# Patient Record
Sex: FEMALE | Race: WHITE | NOT HISPANIC OR LATINO | Employment: OTHER | URBAN - METROPOLITAN AREA
[De-identification: names, ages, dates, MRNs, and addresses within clinical notes are randomized per-mention and may not be internally consistent; named-entity substitution may affect disease eponyms.]

---

## 2023-11-17 DIAGNOSIS — I10 HYPERTENSION, UNSPECIFIED TYPE: ICD-10-CM

## 2023-11-17 RX ORDER — TRIAMTERENE/HYDROCHLOROTHIAZID 37.5-25 MG
1 TABLET ORAL DAILY
COMMUNITY
Start: 2023-11-10

## 2023-11-17 RX ORDER — LOSARTAN POTASSIUM 50 MG/1
50 TABLET ORAL DAILY
COMMUNITY
Start: 2022-01-12 | End: 2023-11-17 | Stop reason: SDUPTHER

## 2023-11-17 RX ORDER — AMLODIPINE BESYLATE 5 MG/1
5 TABLET ORAL DAILY
COMMUNITY
Start: 2023-11-08

## 2023-11-17 RX ORDER — LOSARTAN POTASSIUM 50 MG/1
50 TABLET ORAL DAILY
Qty: 90 TABLET | Refills: 0 | Status: SHIPPED | OUTPATIENT
Start: 2023-11-17 | End: 2024-02-15

## 2023-11-17 NOTE — TELEPHONE ENCOUNTER
Pt called to let Dr Ramicone know that she went to [facility] ER for high BP. Her losartan was dc'd and amlodipine 5mg daily was started. Pt is still taking triam-hydrochlorothiazide 25/37.5 once daily.     Reviewed with Dr Ramicone, he would like her to restart losartan but at lower dose than previous- 50mg once daily.     Called pt and notified, losartan order pended to Dr Ramicone.

## 2023-12-05 DIAGNOSIS — I50.9 CONGESTIVE HEART FAILURE, UNSPECIFIED HF CHRONICITY, UNSPECIFIED HEART FAILURE TYPE (MULTI): ICD-10-CM

## 2024-01-11 ENCOUNTER — HOSPITAL ENCOUNTER (OUTPATIENT)
Dept: CARDIOLOGY | Facility: CLINIC | Age: 79
Discharge: HOME | End: 2024-01-11
Payer: COMMERCIAL

## 2024-01-11 DIAGNOSIS — I50.9 CONGESTIVE HEART FAILURE, UNSPECIFIED HF CHRONICITY, UNSPECIFIED HEART FAILURE TYPE (MULTI): ICD-10-CM

## 2024-01-11 PROCEDURE — 93294 REM INTERROG EVL PM/LDLS PM: CPT | Performed by: INTERNAL MEDICINE

## 2024-01-11 PROCEDURE — 93296 REM INTERROG EVL PM/IDS: CPT

## 2024-02-02 ENCOUNTER — APPOINTMENT (OUTPATIENT)
Dept: RADIOLOGY | Facility: HOSPITAL | Age: 79
End: 2024-02-02
Payer: COMMERCIAL

## 2024-04-29 ENCOUNTER — HOSPITAL ENCOUNTER (OUTPATIENT)
Dept: CARDIOLOGY | Facility: CLINIC | Age: 79
Discharge: HOME | End: 2024-04-29
Payer: COMMERCIAL

## 2024-04-29 DIAGNOSIS — I49.5 SICK SINUS SYNDROME (MULTI): ICD-10-CM

## 2024-04-29 DIAGNOSIS — Z95.0 PRESENCE OF CARDIAC PACEMAKER: ICD-10-CM

## 2024-04-29 PROCEDURE — 93294 REM INTERROG EVL PM/LDLS PM: CPT | Performed by: INTERNAL MEDICINE

## 2024-04-29 PROCEDURE — 93296 REM INTERROG EVL PM/IDS: CPT

## 2024-06-03 ENCOUNTER — HOSPITAL ENCOUNTER (OUTPATIENT)
Dept: CARDIOLOGY | Facility: CLINIC | Age: 79
Discharge: HOME | End: 2024-06-03
Payer: COMMERCIAL

## 2024-06-03 DIAGNOSIS — Z95.0 PRESENCE OF CARDIAC PACEMAKER: ICD-10-CM

## 2024-06-03 DIAGNOSIS — I49.5 SICK SINUS SYNDROME (MULTI): ICD-10-CM

## 2024-06-20 ENCOUNTER — HOSPITAL ENCOUNTER (OUTPATIENT)
Dept: CARDIOLOGY | Facility: CLINIC | Age: 79
Discharge: HOME | End: 2024-06-20
Payer: COMMERCIAL

## 2024-06-20 DIAGNOSIS — I49.5 SICK SINUS SYNDROME (MULTI): ICD-10-CM

## 2024-06-20 DIAGNOSIS — Z95.0 PRESENCE OF CARDIAC PACEMAKER: ICD-10-CM

## 2024-07-11 ENCOUNTER — HOSPITAL ENCOUNTER (OUTPATIENT)
Dept: CARDIOLOGY | Facility: CLINIC | Age: 79
Discharge: HOME | End: 2024-07-11
Payer: COMMERCIAL

## 2024-07-11 DIAGNOSIS — I49.5 SINOATRIAL NODE DYSFUNCTION (MULTI): ICD-10-CM

## 2024-07-25 ENCOUNTER — HOSPITAL ENCOUNTER (OUTPATIENT)
Dept: CARDIOLOGY | Facility: CLINIC | Age: 79
Discharge: HOME | End: 2024-07-25
Payer: COMMERCIAL

## 2024-07-25 DIAGNOSIS — Z95.0 PRESENCE OF CARDIAC PACEMAKER: ICD-10-CM

## 2024-07-25 DIAGNOSIS — I49.5 SICK SINUS SYNDROME (MULTI): ICD-10-CM

## 2024-08-21 ENCOUNTER — HOSPITAL ENCOUNTER (OUTPATIENT)
Dept: CARDIOLOGY | Facility: CLINIC | Age: 79
Discharge: HOME | End: 2024-08-21
Payer: COMMERCIAL

## 2024-08-21 DIAGNOSIS — Z95.0 PRESENCE OF CARDIAC PACEMAKER: ICD-10-CM

## 2024-08-21 DIAGNOSIS — I49.5 SICK SINUS SYNDROME (MULTI): ICD-10-CM

## 2024-08-21 PROCEDURE — 93296 REM INTERROG EVL PM/IDS: CPT

## 2024-08-28 ENCOUNTER — HOSPITAL ENCOUNTER (OUTPATIENT)
Dept: CARDIOLOGY | Facility: CLINIC | Age: 79
Discharge: HOME | End: 2024-08-28
Payer: COMMERCIAL

## 2024-08-28 DIAGNOSIS — Z95.0 PRESENCE OF CARDIAC PACEMAKER: ICD-10-CM

## 2024-08-28 DIAGNOSIS — I49.5 SICK SINUS SYNDROME (MULTI): ICD-10-CM

## 2024-09-03 ENCOUNTER — HOSPITAL ENCOUNTER (OUTPATIENT)
Dept: CARDIOLOGY | Facility: CLINIC | Age: 79
Discharge: HOME | End: 2024-09-03
Payer: COMMERCIAL

## 2024-09-03 DIAGNOSIS — I49.5 SICK SINUS SYNDROME (MULTI): ICD-10-CM

## 2024-09-03 DIAGNOSIS — Z95.0 PRESENCE OF CARDIAC PACEMAKER: ICD-10-CM

## 2024-09-04 ENCOUNTER — HOSPITAL ENCOUNTER (OUTPATIENT)
Dept: CARDIOLOGY | Facility: CLINIC | Age: 79
Discharge: HOME | End: 2024-09-04
Payer: COMMERCIAL

## 2024-09-04 DIAGNOSIS — I49.5 SICK SINUS SYNDROME (MULTI): ICD-10-CM

## 2024-09-04 DIAGNOSIS — Z95.0 PRESENCE OF CARDIAC PACEMAKER: ICD-10-CM

## 2024-09-05 ENCOUNTER — HOSPITAL ENCOUNTER (OUTPATIENT)
Dept: CARDIOLOGY | Facility: CLINIC | Age: 79
Discharge: HOME | End: 2024-09-05
Payer: COMMERCIAL

## 2024-09-05 DIAGNOSIS — I49.5 SICK SINUS SYNDROME (MULTI): ICD-10-CM

## 2024-09-05 DIAGNOSIS — Z95.0 PRESENCE OF CARDIAC PACEMAKER: ICD-10-CM

## 2024-09-09 ENCOUNTER — HOSPITAL ENCOUNTER (OUTPATIENT)
Dept: CARDIOLOGY | Facility: CLINIC | Age: 79
Discharge: HOME | End: 2024-09-09
Payer: COMMERCIAL

## 2024-09-09 DIAGNOSIS — Z95.0 PRESENCE OF CARDIAC PACEMAKER: ICD-10-CM

## 2024-09-09 DIAGNOSIS — I49.5 SICK SINUS SYNDROME (MULTI): ICD-10-CM

## 2024-09-10 ENCOUNTER — HOSPITAL ENCOUNTER (OUTPATIENT)
Dept: CARDIOLOGY | Facility: CLINIC | Age: 79
Discharge: HOME | End: 2024-09-10
Payer: COMMERCIAL

## 2024-09-10 DIAGNOSIS — I49.5 SICK SINUS SYNDROME (MULTI): ICD-10-CM

## 2024-09-10 DIAGNOSIS — Z95.0 PRESENCE OF CARDIAC PACEMAKER: ICD-10-CM

## 2024-09-11 ENCOUNTER — HOSPITAL ENCOUNTER (OUTPATIENT)
Dept: CARDIOLOGY | Facility: CLINIC | Age: 79
Discharge: HOME | End: 2024-09-11
Payer: COMMERCIAL

## 2024-09-11 ENCOUNTER — TELEMEDICINE (OUTPATIENT)
Dept: CARDIOLOGY | Facility: CLINIC | Age: 79
End: 2024-09-11
Payer: COMMERCIAL

## 2024-09-11 DIAGNOSIS — I49.5 SICK SINUS SYNDROME (MULTI): ICD-10-CM

## 2024-09-11 DIAGNOSIS — I49.5 SICK SINUS SYNDROME DUE TO SA NODE DYSFUNCTION (MULTI): ICD-10-CM

## 2024-09-11 DIAGNOSIS — Z95.0 PRESENCE OF CARDIAC PACEMAKER: ICD-10-CM

## 2024-09-11 DIAGNOSIS — I48.0 PAROXYSMAL ATRIAL FIBRILLATION (MULTI): Primary | ICD-10-CM

## 2024-09-11 PROCEDURE — 99442 PR PHYS/QHP TELEPHONE EVALUATION 11-20 MIN: CPT | Performed by: INTERNAL MEDICINE

## 2024-09-12 PROBLEM — I48.0 PAROXYSMAL ATRIAL FIBRILLATION (MULTI): Chronic | Status: ACTIVE | Noted: 2024-09-12

## 2024-09-12 NOTE — ASSESSMENT & PLAN NOTE
[employer] Assurity MRI model #2272, implanted May 13, 2022.  -Right atrial and right ventricular pacing leads [employer] model 2088TC

## 2024-09-12 NOTE — ASSESSMENT & PLAN NOTE
1.  Paroxysmal atrial fibrillation: The patient was recently identified to have atrial fibrillation based on remote pacemaker interrogation data.  I discussed the risk of stroke with the patient today and recommended Eliquis 5 mg twice daily for stroke risk reduction.  The patient is in agreement.  She can discontinue aspirin.    15-minute telephone visit

## 2024-09-12 NOTE — PROGRESS NOTES
"History Of Present Illness:      This is a 78-year-old female with a history of syncope and complete heart block.  She has a permanent pacemaker, but was recently identified to have atrial fibrillation.  Today's visit is a telephone visit to discuss anticoagulation.  The patient has no complaints of palpitations, chest pain, or shortness of breath.  No pacemaker related problems.    Review of Systems  Other review of systems negative     Last Recorded Vitals:      10/4/2021     1:21 PM 12/3/2021     1:16 PM 1/12/2022     3:51 PM 2/28/2022    12:29 PM 4/11/2022     1:19 PM 8/11/2022    12:02 PM 3/27/2023     1:59 PM   Vitals   Systolic 138 150  170 120 138    Diastolic 84 90  88 64 72    Heart Rate 67 78  84 71 68    Temp 36.3 °C (97.3 °F)    35.7 °C (96.3 °F) 36.3 °C (97.3 °F)    Resp     18 20    Height (in) 1.753 m (5' 9\") 1.753 m (5' 9\") 1.753 m (5' 9\") 1.753 m (5' 9\") 1.753 m (5' 9\")  1.753 m (5' 9\")   Weight (lb) 304 302 299 300 298 294 280   BMI 44.89 kg/m2 44.6 kg/m2 44.15 kg/m2 44.3 kg/m2 44.01 kg/m2 43.42 kg/m2 41.35 kg/m2   BSA (m2) 2.59 m2 2.58 m2 2.57 m2 2.57 m2 2.56 m2 2.54 m2 2.49 m2     Allergies:  Patient has no known allergies.    Outpatient Medications:  Current Outpatient Medications   Medication Instructions    amLODIPine (NORVASC) 5 mg, oral, Daily    apixaban (ELIQUIS) 5 mg, oral, 2 times daily    losartan (COZAAR) 50 mg, oral, Daily    triamterene-hydrochlorothiazid (Maxzide-25) 37.5-25 mg tablet 1 tablet, oral, Daily, for blood pressure     Physical Exam:    No exam, telephone visit     Cardiology Tests:  I have personally review the diagnostic cardiac testing and my interpretation is as follows:    Echocardiogram February 2021: Ejection fraction 55 to 60%    Assessment/Plan   Problem List Items Addressed This Visit             ICD-10-CM    Sick sinus syndrome due to SA node dysfunction (Multi) I49.5     [employer] Assurity MRI model #2272, implanted May 13, 2022.  -Right atrial and " right ventricular pacing leads [employer] model 2088TC         Paroxysmal atrial fibrillation (Multi) - Primary (Chronic) I48.0     1.  Paroxysmal atrial fibrillation: The patient was recently identified to have atrial fibrillation based on remote pacemaker interrogation data.  I discussed the risk of stroke with the patient today and recommended Eliquis 5 mg twice daily for stroke risk reduction.  The patient is in agreement.  She can discontinue aspirin.    15-minute telephone visit         Relevant Medications    apixaban (Eliquis) 5 mg tablet     James C Ramicone, DO

## 2024-09-16 ENCOUNTER — HOSPITAL ENCOUNTER (OUTPATIENT)
Dept: CARDIOLOGY | Facility: CLINIC | Age: 79
Discharge: HOME | End: 2024-09-16
Payer: COMMERCIAL

## 2024-09-16 DIAGNOSIS — Z95.0 PRESENCE OF CARDIAC PACEMAKER: ICD-10-CM

## 2024-09-16 DIAGNOSIS — I49.5 SICK SINUS SYNDROME (MULTI): ICD-10-CM

## 2024-09-20 ENCOUNTER — TELEPHONE (OUTPATIENT)
Dept: CARDIOLOGY | Facility: CLINIC | Age: 79
End: 2024-09-20
Payer: COMMERCIAL

## 2024-09-20 DIAGNOSIS — I48.0 PAROXYSMAL ATRIAL FIBRILLATION (MULTI): ICD-10-CM

## 2024-09-20 NOTE — TELEPHONE ENCOUNTER
Pt called stating Eliquis is too expensive. Per Dr. Ramicone - Ok to start Xarelto 20mg daily instead if cheaper. Spoke to pt and made aware. Clinical pharmacy referral placed to see if patient qualifies for financial assistance. Rx pended to Dr. Ramicone.

## 2024-09-30 ENCOUNTER — SPECIALTY PHARMACY (OUTPATIENT)
Dept: PHARMACY | Facility: CLINIC | Age: 79
End: 2024-09-30

## 2024-09-30 DIAGNOSIS — I48.0 PAROXYSMAL ATRIAL FIBRILLATION (MULTI): Primary | ICD-10-CM

## 2024-11-06 NOTE — PROGRESS NOTES
Pharmacist Clinic: Cardiology Management    Nahed Carlin is a 79 y.o. female was referred to Clinical Pharmacy Team for Anticoagulation management.     Referring Provider: Ramicone, James C, DO    THIS IS A NEW PATIENT APPOINTMENT. PATIENT WILL BE ESTABLISHING CARE WITH CLINICAL PHARMACY.    Appointment was completed by Nahed who was reached at primary number.    Allergies Reviewed? Yes    No Known Allergies    Past Medical History:   Diagnosis Date    Bradycardia, unspecified 09/30/2021    Symptomatic bradycardia    Paroxysmal atrial fibrillation (Multi) 09/12/2024    Personal history of other diseases of the circulatory system 07/29/2019    History of cardiac arrhythmia    Personal history of other diseases of the respiratory system     History of bronchitis    Personal history of pneumonia (recurrent)     History of pneumonia    Solitary pulmonary nodule 04/19/2016    Lung nodule       Current Outpatient Medications on File Prior to Visit   Medication Sig Dispense Refill    albuterol 1.25 mg/3 mL nebulizer solution Take 3 mL (1.25 mg) by nebulization every 6 hours if needed for wheezing.      allopurinol (Zyloprim) 300 mg tablet Take 1 tablet (300 mg) by mouth once daily.      amLODIPine (Norvasc) 5 mg tablet Take 1 tablet (5 mg) by mouth once daily.      cholecalciferol (Vitamin D3) 25 MCG (1000 UT) capsule Take 1 capsule (25 mcg) by mouth once daily.      DULoxetine (Cymbalta) 60 mg DR capsule Take 1 capsule (60 mg) by mouth once daily. Do not crush or chew.      fluticasone-umeclidin-vilanter (Trelegy Ellipta) 200-62.5-25 mcg blister with device Inhale 1 puff once daily.      levothyroxine (Synthroid, Levoxyl) 175 mcg tablet Take 1 tablet (175 mcg) by mouth early in the morning.. Take on an empty stomach at the same time each day, either 30 to 60 minutes prior to breakfast      losartan (Cozaar) 50 mg tablet Take 1 tablet (50 mg) by mouth once daily. 90 tablet 0    lovastatin (Altoprev) 40 mg 24 hr  "tablet Take 1 tablet (40 mg) by mouth once daily at bedtime. Do not crush, chew, or split.      metFORMIN XR (Glucophage-XR) 750 mg 24 hr tablet Take 1 tablet (750 mg) by mouth once daily in the evening. Take with meals. Do not crush, chew, or split.      rivaroxaban (Xarelto) 20 mg tablet Take 1 tablet (20 mg) by mouth once daily in the evening. Take with meals. Take with food. 90 tablet 3    [DISCONTINUED] triamterene-hydrochlorothiazid (Maxzide-25) 37.5-25 mg tablet Take 1 tablet by mouth once daily. for blood pressure (Patient not taking: Reported on 11/7/2024)       No current facility-administered medications on file prior to visit.         RELEVANT LAB RESULTS:  Lab Results   Component Value Date    BILITOT 0.5 05/11/2022    CALCIUM 10.4 (H) 05/16/2022    CO2 30 05/16/2022     05/16/2022    CREATININE 0.90 05/16/2022    GLUCOSE 127 (H) 05/16/2022    ALKPHOS 64 05/11/2022    K 3.9 05/16/2022    PROT 7.3 05/11/2022     05/16/2022    AST 28 05/11/2022    ALT 14 05/11/2022    BUN 22 05/16/2022    ANIONGAP 12 05/16/2022    MG 1.83 05/16/2022    PHOS 4.1 01/03/2022    ALBUMIN 4.0 05/11/2022    GFRF 66 05/16/2022     Lab Results   Component Value Date    TRIG 130 08/26/2020    CHOL 151 08/26/2020    HDL 44.4 08/26/2020     No results found for: \"BMCBC\", \"CBCDIF\"     PHARMACEUTICAL ASSESSMENT:    MEDICATION RECONCILIATION    Home Pharmacy Reviewed? Yes, describe: [facility]    Added:  - albuterol, allopurinol, vitamin D3, duloxetine, Trelegy, levothyroxine, lovastatin, metformin  Changed:  - none  Removed:  - Maxzide    Drug Interactions? No    Medication Documentation Review Audit    **Prior to Admission medications have not yet been reviewed**         DISEASE MANAGEMENT ASSESSMENT:     ANTICOAGULATION ASSESSMENT    The ASCVD Risk score (Dl DK, et al., 2019) failed to calculate for the following reasons:    The 2019 ASCVD risk score is only valid for ages 40 to 79    DIAGNOSIS: prevention of " nonvalvular atrial fibrilliation stroke and systemic embolism  - Patient is projected to be on anticoagulation indefinitely  - [id number] Score: [6] (only included if diagnosis is atrial fibrillation)   Age: [<65 (0)] [65-74 (+1)] [> 75 (+2)]: 2  Sex: [Male/Female (+1)]: 1  CHF history: [No/Yes(+1)]: 1  Hypertension history: [No/Yes(+1)]: 1  Stroke/TIA/thromboembolism history: [No/Yes(+2)]: 0  Vascular disease history (prior MI, peripheral artery disease, aortic plaque): [No/Yes(+1)]: 0  Diabetes history: [No/Yes(+1)]: 1    CURRENT PHARMACOTHERAPY:   Xarelto 20mg daily - has not started because she was worried about side effects and was finishing last few doses of Plavix.  CrCl 98ml/min (over a year ago updated lab ordered)    RELEVANT PAST MEDICAL HISTORY:   Afib, HTN, CHF, HLD, COPD, T2DM, pacemaker placement, CKD.    Affordability/Accessibility: $42 / 30 days  Adherence/Organization: new start medication  Adverse Reactions: new start medication  Recent Hospitalizations: none reported  Recent Falls/Trauma: none reported  Changes in Tobacco or Alcohol Intake: did not discuss social history at today's appointment.    EDUCATION/COUNSELING:   - Counseled patient on MOA, expectations, duration of therapy, contraindications, administration, and monitoring parameters  - Counseled patient of side effects that are indicative of bleeding such as dark tarry stool, unexplainable bruising, or vomiting up a coffee ground like substance      DISCUSSION/NOTES:   Nahed has not started Xarelto. Discussed side effect profile of Xarelto and what to expect when starting the medication. Reports they will start this weekend.  Reports she is on fixed income and should qualify for  PAP to help with the cost of the medication.    ASSESSMENT:    Assessment/Plan   Problem List Items Addressed This Visit       Paroxysmal atrial fibrillation (Multi) (Chronic)     No dose adjustments needed to Xarelto at today's visit.  CrCl >50ml/min          Relevant Orders    Referral to Clinical Pharmacy      Patient Assistance Program (PAP)    Application for program to be submitted for the following medications: Xarelto    [facility] Permanent Address: [place]   Prescription Insurance:   Yes   Members of Household: 1   Files Taxes: No       Patient will be email financial information to pharmacist directly at [email or web address].    Patient verbally reports monthly or yearly income which is less than 400% federal poverty level    Patient aware this process may take up to 6 weeks.     If approved medication must be filled through [facility] and MEDICATION WILL BE MAILED TO PATIENT.         RECOMMENDATIONS/PLAN:    START  Xarelto 20mg daily    Last Appnt with Referring Provider: 9/11/24  Next Appnt with Referring Provider: needs scheduling  Clinical Pharmacist follow up: 12/6/24  Type of Encounter: Virtual    Tashi Alonso PharmD    Verbal consent to manage patient's drug therapy was obtained from the patient . They were informed they may decline to participate or withdraw from participation in pharmacy services at any time.    Continue all meds under the continuation of care with the referring provider and clinical pharmacy team.

## 2024-11-07 ENCOUNTER — APPOINTMENT (OUTPATIENT)
Dept: PHARMACY | Facility: HOSPITAL | Age: 79
End: 2024-11-07
Payer: COMMERCIAL

## 2024-11-07 DIAGNOSIS — I48.0 PAROXYSMAL ATRIAL FIBRILLATION (MULTI): ICD-10-CM

## 2024-11-07 RX ORDER — ALBUTEROL SULFATE 1.25 MG/3ML
1.25 SOLUTION RESPIRATORY (INHALATION) EVERY 6 HOURS PRN
COMMUNITY

## 2024-11-07 RX ORDER — FLUTICASONE FUROATE, UMECLIDINIUM BROMIDE AND VILANTEROL TRIFENATATE 200; 62.5; 25 UG/1; UG/1; UG/1
1 POWDER RESPIRATORY (INHALATION) DAILY
COMMUNITY

## 2024-11-07 RX ORDER — METFORMIN HYDROCHLORIDE 750 MG/1
750 TABLET, EXTENDED RELEASE ORAL
COMMUNITY

## 2024-11-07 RX ORDER — VIT C/E/ZN/COPPR/LUTEIN/ZEAXAN 250MG-90MG
25 CAPSULE ORAL DAILY
COMMUNITY

## 2024-11-07 RX ORDER — LEVOTHYROXINE SODIUM 175 UG/1
175 TABLET ORAL DAILY
COMMUNITY

## 2024-11-07 RX ORDER — ALLOPURINOL 300 MG/1
300 TABLET ORAL DAILY
COMMUNITY

## 2024-11-07 RX ORDER — DULOXETIN HYDROCHLORIDE 60 MG/1
60 CAPSULE, DELAYED RELEASE ORAL DAILY
COMMUNITY

## 2024-11-07 NOTE — Clinical Note
Med list updated. Will start [facility] PAP process for Xarelto. Patient has not started because she was unclear on side effects, and uses a pill box and did not get around to adding the medication in. Understands to start the medication now.

## 2024-11-11 DIAGNOSIS — I48.0 PAROXYSMAL ATRIAL FIBRILLATION (MULTI): Primary | ICD-10-CM

## 2024-11-15 ENCOUNTER — TELEPHONE (OUTPATIENT)
Dept: PHARMACY | Facility: HOSPITAL | Age: 79
End: 2024-11-15
Payer: COMMERCIAL

## 2024-11-15 PROCEDURE — RXMED WILLOW AMBULATORY MEDICATION CHARGE

## 2024-11-15 NOTE — TELEPHONE ENCOUNTER
Patient Assistance Program Approval:     We are pleased to inform you that your application for assistance has been approved.     This approval is valid through  11/15/2025  as long as the following criteria continue to be satisfied:     Your medication (Xarelto) remains covered under your current insurance plan.   Your prescriber does not discontinue therapy.   You do not seek reimbursement from any other private or government-funded programs for the  medication.    Under this program, the pharmacy will first bill your insurance plan for your indemnified specified medication. The [employer] Assistance Fund will then offset your copay balance, so that your out-of pocket expense for your specialty medication will be $0.00.    Roxy Warner, PharmD

## 2024-11-20 ENCOUNTER — PHARMACY VISIT (OUTPATIENT)
Dept: PHARMACY | Facility: CLINIC | Age: 79
End: 2024-11-20
Payer: MEDICARE

## 2024-12-06 ENCOUNTER — APPOINTMENT (OUTPATIENT)
Dept: PHARMACY | Facility: HOSPITAL | Age: 79
End: 2024-12-06
Payer: COMMERCIAL

## 2024-12-06 DIAGNOSIS — I48.0 PAROXYSMAL ATRIAL FIBRILLATION (MULTI): ICD-10-CM

## 2024-12-06 NOTE — PROGRESS NOTES
Pharmacist Clinic: Cardiology Management    Nahed Carlin is a 79 y.o. female was referred to Clinical Pharmacy Team for Anticoagulation management.     Referring Provider: Ramicone, James C,     THIS IS A FOLLOW UP PATIENT APPOINTMENT. AT LAST VISIT ON 11/7/24 WITH PHARMACIST (Tashi Alonso).    Appointment was completed by Nahed who was reached at primary number.    REVIEW OF PAST APPNT (IF APPLICABLE):   Nahed was screened and approved to receive Xarelto through  PAP with a renewal date of 11/15/25    No Known Allergies    Past Medical History:   Diagnosis Date    Bradycardia, unspecified 09/30/2021    Symptomatic bradycardia    Paroxysmal atrial fibrillation (Multi) 09/12/2024    Personal history of other diseases of the circulatory system 07/29/2019    History of cardiac arrhythmia    Personal history of other diseases of the respiratory system     History of bronchitis    Personal history of pneumonia (recurrent)     History of pneumonia    Solitary pulmonary nodule 04/19/2016    Lung nodule       Current Outpatient Medications on File Prior to Visit   Medication Sig Dispense Refill    albuterol 1.25 mg/3 mL nebulizer solution Take 3 mL (1.25 mg) by nebulization every 6 hours if needed for wheezing.      allopurinol (Zyloprim) 300 mg tablet Take 1 tablet (300 mg) by mouth once daily.      amLODIPine (Norvasc) 5 mg tablet Take 1 tablet (5 mg) by mouth once daily.      cholecalciferol (Vitamin D3) 25 MCG (1000 UT) capsule Take 1 capsule (25 mcg) by mouth once daily.      DULoxetine (Cymbalta) 60 mg DR capsule Take 1 capsule (60 mg) by mouth once daily. Do not crush or chew.      fluticasone-umeclidin-vilanter (Trelegy Ellipta) 200-62.5-25 mcg blister with device Inhale 1 puff once daily.      levothyroxine (Synthroid, Levoxyl) 175 mcg tablet Take 1 tablet (175 mcg) by mouth early in the morning.. Take on an empty stomach at the same time each day, either 30 to 60 minutes prior to breakfast       "losartan (Cozaar) 50 mg tablet Take 1 tablet (50 mg) by mouth once daily. 90 tablet 0    lovastatin (Altoprev) 40 mg 24 hr tablet Take 1 tablet (40 mg) by mouth once daily at bedtime. Do not crush, chew, or split.      metFORMIN XR (Glucophage-XR) 750 mg 24 hr tablet Take 1 tablet (750 mg) by mouth once daily in the evening. Take with meals. Do not crush, chew, or split.      rivaroxaban (Xarelto) 20 mg tablet Take 1 tablet (20 mg) by mouth once daily in the evening. Take with meals. Take with food. 90 tablet 3     No current facility-administered medications on file prior to visit.         RELEVANT LAB RESULTS:  Lab Results   Component Value Date    BILITOT 0.5 05/11/2022    CALCIUM 10.4 (H) 05/16/2022    CO2 30 05/16/2022     05/16/2022    CREATININE 0.90 05/16/2022    GLUCOSE 127 (H) 05/16/2022    ALKPHOS 64 05/11/2022    K 3.9 05/16/2022    PROT 7.3 05/11/2022     05/16/2022    AST 28 05/11/2022    ALT 14 05/11/2022    BUN 22 05/16/2022    ANIONGAP 12 05/16/2022    MG 1.83 05/16/2022    PHOS 4.1 01/03/2022    ALBUMIN 4.0 05/11/2022    GFRF 66 05/16/2022     Lab Results   Component Value Date    TRIG 130 08/26/2020    CHOL 151 08/26/2020    HDL 44.4 08/26/2020     No results found for: \"BMCBC\", \"CBCDIF\"     PHARMACEUTICAL ASSESSMENT:    Drug Interactions? No    Medication Documentation Review Audit    **Prior to Admission medications have not yet been reviewed**         DISEASE MANAGEMENT ASSESSMENT:     ANTICOAGULATION ASSESSMENT    The ASCVD Risk score (Dl DK, et al., 2019) failed to calculate for the following reasons:    The 2019 ASCVD risk score is only valid for ages 40 to 79    DIAGNOSIS: prevention of nonvalvular atrial fibrilliation stroke and systemic embolism  - Patient is projected to be on anticoagulation indefinitely  - [id number] Score: [6] (only included if diagnosis is atrial fibrillation)   Age: [<65 (0)] [65-74 (+1)] [> 75 (+2)]: 2  Sex: [Male/Female (+1)]: 1  CHF history: " [No/Yes(+1)]: 1  Hypertension history: [No/Yes(+1)]: 1  Stroke/TIA/thromboembolism history: [No/Yes(+2)]: 0  Vascular disease history (prior MI, peripheral artery disease, aortic plaque): [No/Yes(+1)]: 0  Diabetes history: [No/Yes(+1)]: 1     CURRENT PHARMACOTHERAPY:   Xarelto 20mg daily  CrCl 98ml/min (over a year ago updated lab ordered)     RELEVANT PAST MEDICAL HISTORY:   Afib, HTN, CHF, HLD, COPD, T2DM, pacemaker placement, CKD.      Affordability/Accessibility:  PAP  Adherence/Organization: reports adherence  Adverse Reactions: minimal bruising  Recent Hospitalizations: none reported  Recent Falls/Trauma: none reported  Changes in Tobacco or Alcohol Intake:   Tobacco: current smoker  Alcohol: does not use    EDUCATION/COUNSELING:   - Counseled patient on MOA, expectations, duration of therapy, contraindications, administration, and monitoring parameters  - Counseled patient of side effects that are indicative of bleeding such as dark tarry stool, unexplainable bruising, or vomiting up a coffee ground like substance      DISCUSSION/NOTES:   Nahed reports no issues with adherence to Xarelto. States that she has had some bruising and one bleed, but all have healed on their own.   Discussed when to go to ED to have bleed evaluated.  Patient is a current smoker who wants to quit, but reports she has tried several times in the past and has not been able to kick the habit.    ASSESSMENT:    Assessment/Plan   Problem List Items Addressed This Visit       Paroxysmal atrial fibrillation (Multi) (Chronic)     No dose adjustments needed to Xarelto at today's visit.  CrCl >50ml/min           Relevant Orders    Referral to Clinical Pharmacy         RECOMMENDATIONS/PLAN:    CONTINUE  Xarelto 20mg daily    Last Appnt with Referring Provider: 9/11/24  Next Appnt with Referring Provider: needs scheduling  Clinical Pharmacist follow up: 6/6/25  VAF/Application Expiration: Yes    Date: 11/15/25  Type of Encounter:  Karen Alonso PharmD    Verbal consent to manage patient's drug therapy was obtained from the patient . They were informed they may decline to participate or withdraw from participation in pharmacy services at any time.    Continue all meds under the continuation of care with the referring provider and clinical pharmacy team.

## 2024-12-06 NOTE — Clinical Note
Nahed is having some bruising and bleeding will on Xarelto. Reports all events have healed on their own. Discussed when to go to ED for bleeding event.

## 2025-01-09 ENCOUNTER — HOSPITAL ENCOUNTER (OUTPATIENT)
Dept: CARDIOLOGY | Facility: CLINIC | Age: 80
Discharge: HOME | End: 2025-01-09
Payer: MEDICARE

## 2025-01-09 DIAGNOSIS — Z95.0 PRESENCE OF CARDIAC PACEMAKER: ICD-10-CM

## 2025-01-09 DIAGNOSIS — I49.5 SICK SINUS SYNDROME (MULTI): ICD-10-CM

## 2025-01-09 PROCEDURE — 93296 REM INTERROG EVL PM/IDS: CPT

## 2025-02-12 PROCEDURE — RXMED WILLOW AMBULATORY MEDICATION CHARGE

## 2025-02-14 ENCOUNTER — PHARMACY VISIT (OUTPATIENT)
Dept: PHARMACY | Facility: CLINIC | Age: 80
End: 2025-02-14
Payer: COMMERCIAL

## 2025-02-26 ENCOUNTER — HOSPITAL ENCOUNTER (OUTPATIENT)
Dept: CARDIOLOGY | Facility: CLINIC | Age: 80
Discharge: HOME | End: 2025-02-26
Payer: MEDICARE

## 2025-02-26 DIAGNOSIS — Z95.0 PRESENCE OF CARDIAC PACEMAKER: ICD-10-CM

## 2025-02-26 DIAGNOSIS — I49.5 SICK SINUS SYNDROME (MULTI): ICD-10-CM

## 2025-03-19 ENCOUNTER — HOSPITAL ENCOUNTER (OUTPATIENT)
Dept: CARDIOLOGY | Facility: CLINIC | Age: 80
Discharge: HOME | End: 2025-03-19
Payer: MEDICARE

## 2025-03-19 DIAGNOSIS — I49.5 SICK SINUS SYNDROME (MULTI): ICD-10-CM

## 2025-03-19 DIAGNOSIS — Z95.0 PRESENCE OF CARDIAC PACEMAKER: ICD-10-CM

## 2025-04-10 ENCOUNTER — HOSPITAL ENCOUNTER (OUTPATIENT)
Dept: CARDIOLOGY | Facility: CLINIC | Age: 80
Discharge: HOME | End: 2025-04-10
Payer: MEDICARE

## 2025-04-10 DIAGNOSIS — Z95.0 PRESENCE OF CARDIAC PACEMAKER: ICD-10-CM

## 2025-04-10 DIAGNOSIS — I49.5 SICK SINUS SYNDROME (MULTI): ICD-10-CM

## 2025-04-10 PROCEDURE — 93296 REM INTERROG EVL PM/IDS: CPT

## 2025-04-10 PROCEDURE — 93294 REM INTERROG EVL PM/LDLS PM: CPT | Performed by: INTERNAL MEDICINE

## 2025-05-28 PROCEDURE — RXMED WILLOW AMBULATORY MEDICATION CHARGE

## 2025-05-29 ENCOUNTER — PHARMACY VISIT (OUTPATIENT)
Dept: PHARMACY | Facility: CLINIC | Age: 80
End: 2025-05-29
Payer: COMMERCIAL

## 2025-06-05 NOTE — PROGRESS NOTES
"  Pharmacist Clinic: Cardiology Management    Nahed Carlin is a 79 y.o. female was referred to Clinical Pharmacy Team for Anticoagulation management.     Referring Provider: Ramicone, James C, DO    THIS IS A FOLLOW UP PATIENT APPOINTMENT. AT LAST VISIT ON 12/6/24 WITH PHARMACIST (Tashi Alonso).    Appointment was completed by Nahed who was reached at primary number.    REVIEW OF PAST APPNT (IF APPLICABLE):   Nahed is currently receiving Xarelto through [facility] with a renewal date of 11/15/25.  At last appointment noted adherence to the medications with no side effects.      Allergies[1]    Medical History[2]    Medications Ordered Prior to Encounter[3]      RELEVANT LAB RESULTS:  Lab Results   Component Value Date    BILITOT 0.5 05/11/2022    CALCIUM 10.4 (H) 05/16/2022    CO2 30 05/16/2022     05/16/2022    CREATININE 0.90 05/16/2022    GLUCOSE 127 (H) 05/16/2022    ALKPHOS 64 05/11/2022    K 3.9 05/16/2022    PROT 7.3 05/11/2022     05/16/2022    AST 28 05/11/2022    ALT 14 05/11/2022    BUN 22 05/16/2022    ANIONGAP 12 05/16/2022    MG 1.83 05/16/2022    PHOS 4.1 01/03/2022    ALBUMIN 4.0 05/11/2022    GFRF 66 05/16/2022     Lab Results   Component Value Date    TRIG 130 08/26/2020    CHOL 151 08/26/2020    HDL 44.4 08/26/2020     No results found for: \"BMCBC\", \"CBCDIF\"     PHARMACEUTICAL ASSESSMENT:    MEDICATION RECONCILIATION    Was a medication reconciliation completed at this visit? No    Drug Interactions? No    Medication Documentation Review Audit    **Prior to Admission medications have not yet been reviewed**         DISEASE MANAGEMENT ASSESSMENT:     ANTICOAGULATION ASSESSMENT    The ASCVD Risk score (Dl KULKARNI, et al., 2019) failed to calculate for the following reasons:    The 2019 ASCVD risk score is only valid for ages 40 to 79    DIAGNOSIS: prevention of nonvalvular atrial fibrilliation stroke and systemic embolism  - Patient is projected to be on anticoagulation " indefinitely  - [id number] Score: [6] (only included if diagnosis is atrial fibrillation)   Age: [<65 (0)] [65-74 (+1)] [> 75 (+2)]: 2  Sex: [Male/Female (+1)]: 1  CHF history: [No/Yes(+1)]: 1  Hypertension history: [No/Yes(+1)]: 1  Stroke/TIA/thromboembolism history: [No/Yes(+2)]: 0  Vascular disease history (prior MI, peripheral artery disease, aortic plaque): [No/Yes(+1)]: 0  Diabetes history: [No/Yes(+1)]: 1     CURRENT PHARMACOTHERAPY:   Xarelto 20mg daily  CrCl 98ml/min (over a year ago updated lab ordered)     RELEVANT PAST MEDICAL HISTORY:   Afib, HTN, CHF, HLD, COPD, T2DM, pacemaker placement, CKD.    Affordability/Accessibility: [facility]  Adherence/Organization: reports adherence  Adverse Reactions: minor bruising  Recent Hospitalizations: none reported  Recent Falls/Trauma: none reported  Changes in Tobacco or Alcohol Intake:   Tobacco: current smoker  Alcohol: does not use    EDUCATION/COUNSELING:   - Counseled patient on MOA, expectations, duration of therapy, contraindications, administration, and monitoring parameters  - Counseled patient of side effects that are indicative of bleeding such as dark tarry stool, unexplainable bruising, or vomiting up a coffee ground like substance       DISCUSSION/NOTES:   Reports adherence to Xarelto. No barriers to adherence noted.  Having some bruising due to frequent scratching at her body. These bruises all heal without concern.    ASSESSMENT:    Assessment/Plan   Problem List Items Addressed This Visit       Paroxysmal atrial fibrillation (Multi) (Chronic)    No dose adjustments needed to Xarelto at today's visit.  CrCl >50ml/min           Relevant Orders    Referral to Clinical Pharmacy         RECOMMENDATIONS/PLAN:    CONTINUE  Xarelto 20mg daily    Last Appnt with Referring Provider: 9/11/24  Next Appnt with Referring Provider: needs scheduling  Clinical Pharmacist follow up: 10/17/25  VAF/Application Expiration: Yes    Date: 11/15/25  Type of Encounter:  Karen Alonso PharmD    Verbal consent to manage patient's drug therapy was obtained from the patient . They were informed they may decline to participate or withdraw from participation in pharmacy services at any time.    Continue all meds under the continuation of care with the referring provider and clinical pharmacy team.            [1] No Known Allergies  [2]   Past Medical History:  Diagnosis Date    Bradycardia, unspecified 09/30/2021    Symptomatic bradycardia    Paroxysmal atrial fibrillation (Multi) 09/12/2024    Personal history of other diseases of the circulatory system 07/29/2019    History of cardiac arrhythmia    Personal history of other diseases of the respiratory system     History of bronchitis    Personal history of pneumonia (recurrent)     History of pneumonia    Solitary pulmonary nodule 04/19/2016    Lung nodule   [3]   Current Outpatient Medications on File Prior to Visit   Medication Sig Dispense Refill    albuterol 1.25 mg/3 mL nebulizer solution Take 3 mL (1.25 mg) by nebulization every 6 hours if needed for wheezing.      allopurinol (Zyloprim) 300 mg tablet Take 1 tablet (300 mg) by mouth once daily.      amLODIPine (Norvasc) 5 mg tablet Take 1 tablet (5 mg) by mouth once daily.      cholecalciferol (Vitamin D3) 25 MCG (1000 UT) capsule Take 1 capsule (25 mcg) by mouth once daily.      DULoxetine (Cymbalta) 60 mg DR capsule Take 1 capsule (60 mg) by mouth once daily. Do not crush or chew.      fluticasone-umeclidin-vilanter (Trelegy Ellipta) 200-62.5-25 mcg blister with device Inhale 1 puff once daily.      levothyroxine (Synthroid, Levoxyl) 175 mcg tablet Take 1 tablet (175 mcg) by mouth early in the morning.. Take on an empty stomach at the same time each day, either 30 to 60 minutes prior to breakfast      losartan (Cozaar) 50 mg tablet Take 1 tablet (50 mg) by mouth once daily. 90 tablet 0    lovastatin (Altoprev) 40 mg 24 hr tablet Take 1 tablet (40 mg) by mouth  once daily at bedtime. Do not crush, chew, or split.      metFORMIN XR (Glucophage-XR) 750 mg 24 hr tablet Take 1 tablet (750 mg) by mouth once daily in the evening. Take with meals. Do not crush, chew, or split.      rivaroxaban (Xarelto) 20 mg tablet Take 1 tablet (20 mg) by mouth once daily in the evening. Take with meals. Take with food. 90 tablet 3     No current facility-administered medications on file prior to visit.

## 2025-06-06 ENCOUNTER — APPOINTMENT (OUTPATIENT)
Dept: PHARMACY | Facility: HOSPITAL | Age: 80
End: 2025-06-06
Payer: COMMERCIAL

## 2025-06-06 DIAGNOSIS — I48.0 PAROXYSMAL ATRIAL FIBRILLATION (MULTI): ICD-10-CM

## 2025-06-06 NOTE — Clinical Note
Reports adherence to Xarelto. No abnormal bruising or bleeding noted. Reminded she will need a yearly follow up scheduled prior to November to stay eligible for  PAP next year. She will reach out to your office to schedule a yearly appointment.

## 2025-07-08 ENCOUNTER — HOSPITAL ENCOUNTER (OUTPATIENT)
Dept: CARDIOLOGY | Facility: CLINIC | Age: 80
Discharge: HOME | End: 2025-07-08
Payer: MEDICARE

## 2025-07-08 DIAGNOSIS — I49.5 SICK SINUS SYNDROME (MULTI): ICD-10-CM

## 2025-07-08 DIAGNOSIS — Z95.0 PRESENCE OF CARDIAC PACEMAKER: ICD-10-CM

## 2025-07-08 PROCEDURE — 93280 PM DEVICE PROGR EVAL DUAL: CPT | Performed by: INTERNAL MEDICINE

## 2025-07-08 PROCEDURE — 93280 PM DEVICE PROGR EVAL DUAL: CPT

## 2025-07-09 DIAGNOSIS — I49.5 SICK SINUS SYNDROME (MULTI): ICD-10-CM

## 2025-07-09 DIAGNOSIS — Z95.0 PRESENCE OF CARDIAC PACEMAKER: ICD-10-CM

## 2025-07-15 PROBLEM — I73.9 PVD (PERIPHERAL VASCULAR DISEASE): Status: ACTIVE | Noted: 2018-05-30

## 2025-07-15 PROBLEM — G56.22 ENTRAPMENT OF ULNAR NERVE, LEFT: Status: ACTIVE | Noted: 2025-07-15

## 2025-07-15 PROBLEM — I44.1 MOBITZ TYPE II BLOCK: Status: RESOLVED | Noted: 2025-07-15 | Resolved: 2025-07-15

## 2025-07-15 PROBLEM — M96.1 LUMBAR POST-LAMINECTOMY SYNDROME: Status: ACTIVE | Noted: 2025-07-15

## 2025-07-15 PROBLEM — R91.1 RIGHT LOWER LOBE PULMONARY NODULE: Status: ACTIVE | Noted: 2025-07-15

## 2025-07-15 PROBLEM — M25.532 LEFT WRIST PAIN: Status: ACTIVE | Noted: 2025-07-15

## 2025-07-15 PROBLEM — S39.012A LOW BACK STRAIN: Status: ACTIVE | Noted: 2025-07-15

## 2025-07-15 PROBLEM — F17.210 CIGARETTE SMOKER: Status: ACTIVE | Noted: 2024-09-19

## 2025-07-15 PROBLEM — E11.9 TYPE 2 DIABETES MELLITUS WITHOUT COMPLICATION: Status: ACTIVE | Noted: 2022-01-04

## 2025-07-15 PROBLEM — N18.30 STAGE 3 CHRONIC KIDNEY DISEASE (MULTI): Status: ACTIVE | Noted: 2025-07-15

## 2025-07-15 PROBLEM — R29.898 COMPLAINTS OF LEG WEAKNESS: Status: ACTIVE | Noted: 2025-07-15

## 2025-07-15 PROBLEM — R59.0 MEDIASTINAL LYMPHADENOPATHY: Status: ACTIVE | Noted: 2025-07-15

## 2025-07-15 PROBLEM — Z95.0 PACEMAKER: Status: ACTIVE | Noted: 2025-07-15

## 2025-07-15 PROBLEM — I44.1 MOBITZ TYPE II BLOCK: Status: ACTIVE | Noted: 2025-07-15

## 2025-07-15 PROBLEM — I48.0 PAROXYSMAL ATRIAL FIBRILLATION (MULTI): Chronic | Status: RESOLVED | Noted: 2024-09-12 | Resolved: 2025-07-15

## 2025-07-15 PROBLEM — Z99.81 ON HOME OXYGEN THERAPY: Status: ACTIVE | Noted: 2025-07-15

## 2025-07-15 PROBLEM — E66.01 MORBID OBESITY (MULTI): Status: ACTIVE | Noted: 2018-05-30

## 2025-07-15 PROBLEM — E55.9 VITAMIN D DEFICIENCY: Status: ACTIVE | Noted: 2025-07-15

## 2025-07-15 PROBLEM — N32.81 OAB (OVERACTIVE BLADDER): Status: ACTIVE | Noted: 2025-07-15

## 2025-07-15 PROBLEM — M19.049 ARTHRITIS OF CARPOMETACARPAL JOINT: Status: ACTIVE | Noted: 2025-07-15

## 2025-07-15 PROBLEM — F32.A MILD DEPRESSION: Status: ACTIVE | Noted: 2018-05-30

## 2025-07-15 PROBLEM — I25.10 CORONARY ARTERY CALCIFICATION: Status: ACTIVE | Noted: 2025-07-15

## 2025-07-15 PROBLEM — I49.5 SICK SINUS SYNDROME DUE TO SA NODE DYSFUNCTION (MULTI): Status: RESOLVED | Noted: 2024-09-03 | Resolved: 2025-07-15

## 2025-07-15 PROBLEM — J44.9 SEVERE CHRONIC OBSTRUCTIVE PULMONARY DISEASE (MULTI): Status: ACTIVE | Noted: 2025-07-15

## 2025-07-15 PROBLEM — I70.213 INTERMITTENT CLAUDICATION OF BOTH LOWER EXTREMITIES DUE TO ATHEROSCLEROSIS: Status: ACTIVE | Noted: 2024-09-19

## 2025-07-15 PROBLEM — G60.9 IDIOPATHIC PERIPHERAL NEUROPATHY: Status: ACTIVE | Noted: 2025-07-15

## 2025-07-15 PROBLEM — N39.3 STRESS INCONTINENCE IN FEMALE: Status: ACTIVE | Noted: 2023-04-24

## 2025-07-15 PROBLEM — R73.03 PREDIABETES: Status: ACTIVE | Noted: 2025-07-15

## 2025-07-15 PROBLEM — G47.33 OBSTRUCTIVE SLEEP APNEA SYNDROME: Status: ACTIVE | Noted: 2025-07-15

## 2025-07-15 PROBLEM — F41.9 ANXIETY: Status: ACTIVE | Noted: 2025-07-15

## 2025-08-06 ENCOUNTER — APPOINTMENT (OUTPATIENT)
Dept: CARDIOLOGY | Facility: CLINIC | Age: 80
End: 2025-08-06
Payer: MEDICARE

## 2025-08-06 VITALS
SYSTOLIC BLOOD PRESSURE: 140 MMHG | HEIGHT: 69 IN | WEIGHT: 226 LBS | DIASTOLIC BLOOD PRESSURE: 80 MMHG | BODY MASS INDEX: 33.47 KG/M2

## 2025-08-06 DIAGNOSIS — I48.0 PAROXYSMAL ATRIAL FIBRILLATION (MULTI): ICD-10-CM

## 2025-08-06 DIAGNOSIS — Z95.0 PRESENCE OF CARDIAC PACEMAKER: Primary | ICD-10-CM

## 2025-08-06 PROBLEM — R73.03 PREDIABETES: Status: RESOLVED | Noted: 2025-07-15 | Resolved: 2025-08-06

## 2025-08-06 PROCEDURE — 99214 OFFICE O/P EST MOD 30 MIN: CPT | Performed by: INTERNAL MEDICINE

## 2025-08-06 PROCEDURE — RXMED WILLOW AMBULATORY MEDICATION CHARGE

## 2025-08-06 PROCEDURE — 1159F MED LIST DOCD IN RCRD: CPT | Performed by: INTERNAL MEDICINE

## 2025-08-06 PROCEDURE — 93000 ELECTROCARDIOGRAM COMPLETE: CPT | Performed by: INTERNAL MEDICINE

## 2025-08-06 RX ORDER — LEVOTHYROXINE SODIUM 150 UG/1
150 TABLET ORAL DAILY
COMMUNITY

## 2025-08-06 NOTE — PROGRESS NOTES
"    History Of Present Illness:      This is an 79-year-old female with history of syncope and complete heart block.  She has a permanent pacemaker.  Patient also history of for atrial fibrillation and has been on anticoagulation.  No hemorrhagic problems on Xarelto.    Review of Systems  Other review of systems negative  Last Recorded Vitals:      12/3/2021     1:16 PM 1/12/2022     3:51 PM 2/28/2022    12:29 PM 4/11/2022     1:19 PM 8/11/2022    12:02 PM 3/27/2023     1:59 PM 8/6/2025     3:09 PM   Vitals   Systolic 150  170 120 138  140   Diastolic 90  88 64 72  80   BP Location       Right arm   Heart Rate 78  84 71 68     Temp    35.7 °C (96.3 °F) 36.3 °C (97.3 °F)     Resp    18 20     Height 1.753 m (5' 9\") 1.753 m (5' 9\") 1.753 m (5' 9\") 1.753 m (5' 9\")  1.753 m (5' 9\") 1.753 m (5' 9\")   Weight (lb) 302 299 300 298 294 280 226   BMI 44.6 kg/m2 44.15 kg/m2 44.3 kg/m2 44.01 kg/m2 43.42 kg/m2 41.35 kg/m2 33.37 kg/m2   BSA (m2) 2.58 m2 2.57 m2 2.57 m2 2.56 m2 2.54 m2 2.49 m2 2.24 m2   Visit Report       Report     Allergies:  Patient has no known allergies.  Outpatient Medications:  Current Outpatient Medications   Medication Instructions    albuterol 1.25 mg, Every 6 hours PRN    allopurinol (ZYLOPRIM) 300 mg, Daily    amLODIPine (NORVASC) 5 mg, Daily    cholecalciferol (VITAMIN D3) 25 mcg, Daily    DULoxetine (CYMBALTA) 60 mg, Daily    levothyroxine (SYNTHROID, LEVOXYL) 150 mcg, Daily    losartan (COZAAR) 50 mg, oral, Daily    lovastatin (ALTOPREV) 40 mg, Nightly    metFORMIN XR (GLUCOPHAGE-XR) 750 mg, Daily with evening meal    rivaroxaban (XARELTO) 20 mg, oral, Daily with evening meal       Physical Exam:    General Appearance:  Alert, oriented, no distress  Skin:  Warm and dry  Head and Neck:  No elevation of JVP, no carotid bruits  Cardiac Exam:  Rhythm is regular, S1 and S2 are normal, no murmur S3 or S4  Lungs:  Clear to auscultation  Extremities:  no edema  Neurologic:  No focal deficits  Psychiatric:  " Appropriate mood and behavior    Lab Results:    CMP:  Recent Labs     05/16/22  0744 05/15/22  0442 05/14/22  0421 05/13/22  0526 05/12/22  0841 05/11/22  1620 01/03/22  0637 01/02/22  0645 01/01/22  0838 12/31/21  1906     --  142 141  --  139 140 140 139 140   K 3.9  --  3.7 3.5  --  3.7 4.1 4.2 4.1 4.0     --  99 104  --  101 103 102 103 104   CO2 30  --  31 29  --  29 33* 32 30 28   ANIONGAP 12  --  16 12  --  13 8* 10 10 12   BUN 22  --  18 15  --  22 21 25* 27* 23   CREATININE 0.90  --  1.00 0.74  --  1.03 1.10* 1.28* 1.38* 1.28*   MG 1.83 1.94 1.84 1.71 1.81 1.80 1.90 1.95 2.03  --      Recent Labs     05/11/22  1620 01/03/22  0637 01/02/22  0645 01/01/22  0838 12/31/21  1906 08/26/20  1022 07/26/19  1315 07/20/19  1140   ALBUMIN 4.0 3.5 3.6 3.8 3.8 4.5 3.8 4.0   ALKPHOS 64  --   --   --  70 51 63 63   ALT 14  --   --   --  18 15 23 26   AST 28  --   --   --  21 15 20 22   BILITOT 0.5  --   --   --  0.4 0.6 0.6 0.5     CBC:  Recent Labs     05/16/22  0744 05/14/22  0421 05/13/22  0526 05/12/22  0841 05/11/22  1620 01/03/22  0637 01/02/22  0645 01/01/22  0838   WBC 7.8 9.7 8.6 9.1 8.2 6.9 6.6 10.3   HGB 13.2 13.5 13.0 12.6 13.0 13.0 12.6 13.4   HCT 40.4 41.7 40.5 38.9 39.2 40.5 39.1 41.3    241 252 248 259 216 219 239   MCV 92 93 92 91 91 93 92 93     COAG:   Recent Labs     05/11/22  1620 12/31/21  1906 07/26/19  1315 07/20/19  1140   INR 1.1 1.1 1.0 1.0   DDIMERVTE  --   --   --  1,529*     Cardiology Tests (personally reviewed):    EKG: Atrial fibrillation    Assessment/Plan   Problem List Items Addressed This Visit           ICD-10-CM    Presence of cardiac pacemaker - Primary Z95.0    1.  Paroxysmal complete heart block: The patient has a history of syncope secondary to intermittent complete heart block.  A dual-chamber pacemaker was implanted May 13, 2022.  Normal pacemaker function based on most recent device interrogation.  Estimated battery longevity 5.5 years.    2.  Paroxysmal  atrial fibrillation: This patient was identified to have atrial fibrillation based on pacemaker data.  She is in atrial fibrillation today but is asymptomatic.  Continue Xarelto 20 mg daily for anticoagulation.    Hypertension: Blood pressure under reasonable control on current medication regimen.  The patient has lost approximately 80 pounds and her blood pressure has come down substantially.          Other Visit Diagnoses         Codes      Paroxysmal atrial fibrillation (Multi)     I48.0    Relevant Orders    ECG 12 lead (Clinic Performed) (Completed)            James C Ramicone, DO

## 2025-08-06 NOTE — ASSESSMENT & PLAN NOTE
1.  Paroxysmal complete heart block: The patient has a history of syncope secondary to intermittent complete heart block.  A dual-chamber pacemaker was implanted May 13, 2022.  Normal pacemaker function based on most recent device interrogation.  Estimated battery longevity 5.5 years.    2.  Paroxysmal atrial fibrillation: This patient was identified to have atrial fibrillation based on pacemaker data.  She is in atrial fibrillation today but is asymptomatic.  Continue Xarelto 20 mg daily for anticoagulation.    3.  Hypertension: Blood pressure under reasonable control on current medication regimen.  The patient has lost approximately 80 pounds and her blood pressure has come down substantially.

## 2025-08-08 ENCOUNTER — PHARMACY VISIT (OUTPATIENT)
Dept: PHARMACY | Facility: CLINIC | Age: 80
End: 2025-08-08
Payer: COMMERCIAL

## 2025-10-17 ENCOUNTER — APPOINTMENT (OUTPATIENT)
Dept: PHARMACY | Facility: HOSPITAL | Age: 80
End: 2025-10-17
Payer: MEDICARE